# Patient Record
Sex: FEMALE | Race: WHITE | Employment: FULL TIME | URBAN - METROPOLITAN AREA
[De-identification: names, ages, dates, MRNs, and addresses within clinical notes are randomized per-mention and may not be internally consistent; named-entity substitution may affect disease eponyms.]

---

## 2020-05-15 ENCOUNTER — APPOINTMENT (OUTPATIENT)
Dept: CT IMAGING | Age: 45
End: 2020-05-15
Attending: EMERGENCY MEDICINE
Payer: COMMERCIAL

## 2020-05-15 ENCOUNTER — HOSPITAL ENCOUNTER (EMERGENCY)
Age: 45
Discharge: HOME | End: 2020-05-16
Attending: EMERGENCY MEDICINE | Admitting: EMERGENCY MEDICINE
Payer: COMMERCIAL

## 2020-05-15 ENCOUNTER — APPOINTMENT (OUTPATIENT)
Dept: GENERAL RADIOLOGY | Age: 45
End: 2020-05-15
Attending: PHYSICIAN ASSISTANT
Payer: COMMERCIAL

## 2020-05-15 ENCOUNTER — APPOINTMENT (OUTPATIENT)
Dept: ULTRASOUND IMAGING | Age: 45
End: 2020-05-15
Attending: EMERGENCY MEDICINE
Payer: COMMERCIAL

## 2020-05-15 VITALS
TEMPERATURE: 98 F | OXYGEN SATURATION: 99 % | HEART RATE: 104 BPM | SYSTOLIC BLOOD PRESSURE: 108 MMHG | DIASTOLIC BLOOD PRESSURE: 70 MMHG | RESPIRATION RATE: 15 BRPM | BODY MASS INDEX: 20.83 KG/M2 | HEIGHT: 64 IN | WEIGHT: 122 LBS

## 2020-05-15 DIAGNOSIS — R14.1 GAS PAIN: ICD-10-CM

## 2020-05-15 DIAGNOSIS — R10.32 ABDOMINAL PAIN, LLQ (LEFT LOWER QUADRANT): Primary | ICD-10-CM

## 2020-05-15 LAB
ALBUMIN SERPL-MCNC: 3.9 G/DL (ref 3.4–5)
ALBUMIN/GLOB SERPL: 1.2 {RATIO} (ref 0.8–1.7)
ALP SERPL-CCNC: 76 U/L (ref 45–117)
ALT SERPL-CCNC: 35 U/L (ref 13–56)
ANION GAP SERPL CALC-SCNC: 5 MMOL/L (ref 3–18)
APPEARANCE UR: CLEAR
AST SERPL-CCNC: 21 U/L (ref 10–38)
BASOPHILS # BLD: 0 K/UL (ref 0–0.1)
BASOPHILS NFR BLD: 1 % (ref 0–2)
BILIRUB SERPL-MCNC: 0.4 MG/DL (ref 0.2–1)
BILIRUB UR QL: NEGATIVE
BUN SERPL-MCNC: 12 MG/DL (ref 7–18)
BUN/CREAT SERPL: 14 (ref 12–20)
CALCIUM SERPL-MCNC: 8.9 MG/DL (ref 8.5–10.1)
CHLORIDE SERPL-SCNC: 108 MMOL/L (ref 100–111)
CO2 SERPL-SCNC: 28 MMOL/L (ref 21–32)
COLOR UR: YELLOW
CREAT SERPL-MCNC: 0.88 MG/DL (ref 0.6–1.3)
DIFFERENTIAL METHOD BLD: NORMAL
EOSINOPHIL # BLD: 0.1 K/UL (ref 0–0.4)
EOSINOPHIL NFR BLD: 2 % (ref 0–5)
ERYTHROCYTE [DISTWIDTH] IN BLOOD BY AUTOMATED COUNT: 12.6 % (ref 11.6–14.5)
GLOBULIN SER CALC-MCNC: 3.3 G/DL (ref 2–4)
GLUCOSE SERPL-MCNC: 98 MG/DL (ref 74–99)
GLUCOSE UR STRIP.AUTO-MCNC: NEGATIVE MG/DL
HCG UR QL: NEGATIVE
HCT VFR BLD AUTO: 41.9 % (ref 35–45)
HGB BLD-MCNC: 13.7 G/DL (ref 12–16)
HGB UR QL STRIP: NEGATIVE
KETONES UR QL STRIP.AUTO: ABNORMAL MG/DL
LEUKOCYTE ESTERASE UR QL STRIP.AUTO: NEGATIVE
LYMPHOCYTES # BLD: 1.5 K/UL (ref 0.9–3.6)
LYMPHOCYTES NFR BLD: 33 % (ref 21–52)
MCH RBC QN AUTO: 30.4 PG (ref 24–34)
MCHC RBC AUTO-ENTMCNC: 32.7 G/DL (ref 31–37)
MCV RBC AUTO: 93.1 FL (ref 74–97)
MONOCYTES # BLD: 0.5 K/UL (ref 0.05–1.2)
MONOCYTES NFR BLD: 10 % (ref 3–10)
NEUTS SEG # BLD: 2.5 K/UL (ref 1.8–8)
NEUTS SEG NFR BLD: 54 % (ref 40–73)
NITRITE UR QL STRIP.AUTO: NEGATIVE
PH UR STRIP: 7 [PH] (ref 5–8)
PLATELET # BLD AUTO: 215 K/UL (ref 135–420)
PMV BLD AUTO: 9.7 FL (ref 9.2–11.8)
POTASSIUM SERPL-SCNC: 3.5 MMOL/L (ref 3.5–5.5)
PROT SERPL-MCNC: 7.2 G/DL (ref 6.4–8.2)
PROT UR STRIP-MCNC: NEGATIVE MG/DL
RBC # BLD AUTO: 4.5 M/UL (ref 4.2–5.3)
SODIUM SERPL-SCNC: 141 MMOL/L (ref 136–145)
SP GR UR REFRACTOMETRY: 1.02 (ref 1–1.03)
TROPONIN I SERPL-MCNC: <0.02 NG/ML (ref 0–0.04)
UROBILINOGEN UR QL STRIP.AUTO: 1 EU/DL (ref 0.2–1)
WBC # BLD AUTO: 4.6 K/UL (ref 4.6–13.2)

## 2020-05-15 PROCEDURE — 93005 ELECTROCARDIOGRAM TRACING: CPT

## 2020-05-15 PROCEDURE — 81025 URINE PREGNANCY TEST: CPT

## 2020-05-15 PROCEDURE — 74177 CT ABD & PELVIS W/CONTRAST: CPT

## 2020-05-15 PROCEDURE — 71045 X-RAY EXAM CHEST 1 VIEW: CPT

## 2020-05-15 PROCEDURE — 74011636320 HC RX REV CODE- 636/320: Performed by: EMERGENCY MEDICINE

## 2020-05-15 PROCEDURE — 84484 ASSAY OF TROPONIN QUANT: CPT

## 2020-05-15 PROCEDURE — 99283 EMERGENCY DEPT VISIT LOW MDM: CPT

## 2020-05-15 PROCEDURE — 76856 US EXAM PELVIC COMPLETE: CPT

## 2020-05-15 PROCEDURE — 96374 THER/PROPH/DIAG INJ IV PUSH: CPT

## 2020-05-15 PROCEDURE — 81003 URINALYSIS AUTO W/O SCOPE: CPT

## 2020-05-15 PROCEDURE — 74011250636 HC RX REV CODE- 250/636: Performed by: EMERGENCY MEDICINE

## 2020-05-15 PROCEDURE — 85025 COMPLETE CBC W/AUTO DIFF WBC: CPT

## 2020-05-15 PROCEDURE — 80053 COMPREHEN METABOLIC PANEL: CPT

## 2020-05-15 RX ORDER — KETOROLAC TROMETHAMINE 30 MG/ML
15 INJECTION, SOLUTION INTRAMUSCULAR; INTRAVENOUS
Status: COMPLETED | OUTPATIENT
Start: 2020-05-15 | End: 2020-05-15

## 2020-05-15 RX ORDER — BUPROPION HYDROCHLORIDE 150 MG/1
TABLET, EXTENDED RELEASE ORAL 2 TIMES DAILY
COMMUNITY

## 2020-05-15 RX ADMIN — KETOROLAC TROMETHAMINE 15 MG: 30 INJECTION, SOLUTION INTRAMUSCULAR at 20:08

## 2020-05-15 RX ADMIN — IOPAMIDOL 90 ML: 612 INJECTION, SOLUTION INTRAVENOUS at 20:25

## 2020-05-15 NOTE — ED PROVIDER NOTES
EMERGENCY DEPARTMENT HISTORY AND PHYSICAL EXAM    7:55 PM      Date: 5/15/2020  Patient Name: Magi Souza    History of Presenting Illness     Chief Complaint   Patient presents with    Abdominal Pain         History Provided By: Patient    Chief Complaint: left lower abd  Duration:  Days  Timing:  Acute  Location: left lower  Quality: Sharp and Pressure  Severity: Moderate  Modifying Factors: none  Associated Symptoms: denies any other associated signs or symptoms      Additional History (Context): Magi Suoza is a 50 y.o. female with No significant past medical history who presents with left lower abdominal pain. Ongoing for a few days. Reports it feels crampy and at times sharp. Also reports feeling bloated and bloating sensation going up into her upper abdomen and chest.  No alleviating factors. Does report she has had decreased appetite. No blood in her stool. No similar prior episodes. No urinary symptoms. Has not taken anything for this. No prior abdominal surgeries. PCP: Linda Cuevas MD    Current Outpatient Medications   Medication Sig Dispense Refill    buPROPion SR (WELLBUTRIN SR) 150 mg SR tablet Take  by mouth two (2) times a day. Past History     Past Medical History:  Past Medical History:   Diagnosis Date    Ovarian cyst        Past Surgical History:  History reviewed. No pertinent surgical history. Family History:  History reviewed. No pertinent family history. Social History:  Social History     Tobacco Use    Smoking status: Never Smoker    Smokeless tobacco: Never Used   Substance Use Topics    Alcohol use: Yes    Drug use: Yes     Types: Marijuana       Allergies:  No Known Allergies      Review of Systems       Review of Systems   Constitutional: Negative for chills and fever. Respiratory: Negative for cough and shortness of breath. Gastrointestinal: Positive for abdominal pain. Negative for blood in stool, diarrhea and vomiting.    Genitourinary: Negative for dysuria and vaginal bleeding. All other systems reviewed and are negative. Physical Exam     Visit Vitals  /70   Pulse (!) 104   Temp 98 °F (36.7 °C)   Resp 15   Ht 5' 4\" (1.626 m)   Wt 55.3 kg (122 lb)   LMP 05/08/2020   SpO2 99%   BMI 20.94 kg/m²         Physical Exam  Constitutional:       Appearance: She is well-developed. HENT:      Head: Normocephalic and atraumatic. Neck:      Musculoskeletal: Neck supple. Vascular: No JVD. Cardiovascular:      Rate and Rhythm: Normal rate and regular rhythm. Pulmonary:      Effort: Pulmonary effort is normal. No respiratory distress. Breath sounds: Normal breath sounds. Abdominal:      General: There is no distension. Palpations: Abdomen is soft. Tenderness: There is abdominal tenderness (moderate) in the left lower quadrant. There is no guarding or rebound. Musculoskeletal:      Comments: No joint tenderness   Skin:     General: Skin is warm and dry. Findings: No erythema. Neurological:      Mental Status: She is alert and oriented to person, place, and time.    Psychiatric:         Judgment: Judgment normal.           Diagnostic Study Results     Labs -  Recent Results (from the past 12 hour(s))   URINALYSIS W/ RFLX MICROSCOPIC    Collection Time: 05/15/20  6:50 PM   Result Value Ref Range    Color YELLOW      Appearance CLEAR      Specific gravity 1.020 1.005 - 1.030      pH (UA) 7.0 5.0 - 8.0      Protein Negative NEG mg/dL    Glucose Negative NEG mg/dL    Ketone TRACE (A) NEG mg/dL    Bilirubin Negative NEG      Blood Negative NEG      Urobilinogen 1.0 0.2 - 1.0 EU/dL    Nitrites Negative NEG      Leukocyte Esterase Negative NEG     HCG URINE, QL    Collection Time: 05/15/20  6:50 PM   Result Value Ref Range    HCG urine, QL Negative NEG     EKG, 12 LEAD, INITIAL    Collection Time: 05/15/20  7:03 PM   Result Value Ref Range    Ventricular Rate 85 BPM    Atrial Rate 85 BPM    P-R Interval 162 ms    QRS Duration 94 ms    Q-T Interval 368 ms    QTC Calculation (Bezet) 437 ms    Calculated P Axis 84 degrees    Calculated R Axis 74 degrees    Calculated T Axis 68 degrees    Diagnosis       Normal sinus rhythm  Normal ECG  No previous ECGs available     CBC WITH AUTOMATED DIFF    Collection Time: 05/15/20  7:30 PM   Result Value Ref Range    WBC 4.6 4.6 - 13.2 K/uL    RBC 4.50 4. 20 - 5.30 M/uL    HGB 13.7 12.0 - 16.0 g/dL    HCT 41.9 35.0 - 45.0 %    MCV 93.1 74.0 - 97.0 FL    MCH 30.4 24.0 - 34.0 PG    MCHC 32.7 31.0 - 37.0 g/dL    RDW 12.6 11.6 - 14.5 %    PLATELET 116 107 - 842 K/uL    MPV 9.7 9.2 - 11.8 FL    NEUTROPHILS 54 40 - 73 %    LYMPHOCYTES 33 21 - 52 %    MONOCYTES 10 3 - 10 %    EOSINOPHILS 2 0 - 5 %    BASOPHILS 1 0 - 2 %    ABS. NEUTROPHILS 2.5 1.8 - 8.0 K/UL    ABS. LYMPHOCYTES 1.5 0.9 - 3.6 K/UL    ABS. MONOCYTES 0.5 0.05 - 1.2 K/UL    ABS. EOSINOPHILS 0.1 0.0 - 0.4 K/UL    ABS. BASOPHILS 0.0 0.0 - 0.1 K/UL    DF AUTOMATED     METABOLIC PANEL, COMPREHENSIVE    Collection Time: 05/15/20  7:30 PM   Result Value Ref Range    Sodium 141 136 - 145 mmol/L    Potassium 3.5 3.5 - 5.5 mmol/L    Chloride 108 100 - 111 mmol/L    CO2 28 21 - 32 mmol/L    Anion gap 5 3.0 - 18 mmol/L    Glucose 98 74 - 99 mg/dL    BUN 12 7.0 - 18 MG/DL    Creatinine 0.88 0.6 - 1.3 MG/DL    BUN/Creatinine ratio 14 12 - 20      GFR est AA >60 >60 ml/min/1.73m2    GFR est non-AA >60 >60 ml/min/1.73m2    Calcium 8.9 8.5 - 10.1 MG/DL    Bilirubin, total 0.4 0.2 - 1.0 MG/DL    ALT (SGPT) 35 13 - 56 U/L    AST (SGOT) 21 10 - 38 U/L    Alk.  phosphatase 76 45 - 117 U/L    Protein, total 7.2 6.4 - 8.2 g/dL    Albumin 3.9 3.4 - 5.0 g/dL    Globulin 3.3 2.0 - 4.0 g/dL    A-G Ratio 1.2 0.8 - 1.7     TROPONIN I    Collection Time: 05/15/20  7:30 PM   Result Value Ref Range    Troponin-I, QT <0.02 0.0 - 0.045 NG/ML       Radiologic Studies -   XR CHEST PORT    (Results Pending)   CT ABD PELV W CONT    (Results Pending)   US PELV NON OB W TV (Results Pending)     CT: Prelim: Small volume nonspecific fluid in the pelvis. No definite acute findings to suggest an etiology for patient's symptoms. Normal appendix. No diverticulitis. Medical Decision Making   I am the first provider for this patient. I reviewed the vital signs, available nursing notes, past medical history, past surgical history, family history and social history. Vital Signs-Reviewed the patient's vital signs. Pulse Oximetry Analysis -  100 on room air (Interpretation)nl       Records Reviewed: Nursing Notes (Time of Review: 7:55 PM)    ED Course: Progress Notes, Reevaluation, and Consults:      Provider Notes (Medical Decision Making): 47-year-old female presented with left lower abdominal pain ongoing for a few days. Does have significant tenderness on exam so plan for CT to better evaluate for possible diverticulitis. Also check UA. Seems less likely torsion based on duration of symptoms. 9:34 PM  Discussed results with patient. She is reporting some mild improvement or symptoms is still complaining of left lower quadrant pain. Will plan for ultrasound to rule out torsion or cyst.    10 PM : Pt care transferred to Dr. Silva Chenye  ,ED provider. History of patient complaint(s), available diagnostic reports and current treatment plan has been discussed thoroughly. Bedside rounding on patient occured : yes . Intended disposition of patient : TBD  Pending diagnostics reports and/or labs (please list):       Diagnosis     Clinical Impression:   1.  Abdominal pain, LLQ (left lower quadrant)        Disposition: TBD

## 2020-05-15 NOTE — ED TRIAGE NOTES
Left pelvic pain for several weeks. Thought pulled muscle. Then Sunday pain started radiating upwards. Feels distended with gas. Took laxative over three days with no relief. . Pain goes under left ribs. Nauseated. Pain worse with eating.  Just ate [place] and now severe pain

## 2020-05-16 LAB
ATRIAL RATE: 85 BPM
CALCULATED P AXIS, ECG09: 84 DEGREES
CALCULATED R AXIS, ECG10: 74 DEGREES
CALCULATED T AXIS, ECG11: 68 DEGREES
DIAGNOSIS, 93000: NORMAL
P-R INTERVAL, ECG05: 162 MS
Q-T INTERVAL, ECG07: 368 MS
QRS DURATION, ECG06: 94 MS
QTC CALCULATION (BEZET), ECG08: 437 MS
VENTRICULAR RATE, ECG03: 85 BPM

## 2020-05-16 NOTE — ED NOTES
2200-patient signed out to me by Dr. Bernett Mortimer. Patient awaiting ultrasound. Plan is if ultrasound do not show acute pathology, specifically in the left adnexa, patient will be discharged home. [id number] -ultrasound negative. Patient will be discharged home with simethicone. Return precautions reviewed with patient in detail. Patient demonstrates understanding.

## 2020-05-16 NOTE — DISCHARGE INSTRUCTIONS
Patient Education        Abdominal Pain: Care Instructions  Your Care Instructions    Abdominal pain has many possible causes. Some aren't serious and get better on their own in a few days. Others need more testing and treatment. If your pain continues or gets worse, you need to be rechecked and may need more tests to find out what is wrong. You may need surgery to correct the problem. Don't ignore new symptoms, such as fever, nausea and vomiting, urination problems, pain that gets worse, and dizziness. These may be signs of a more serious problem. Your doctor may have recommended a follow-up visit in the next 8 to 12 hours. If you are not getting better, you may need more tests or treatment. The doctor has checked you carefully, but problems can develop later. If you notice any problems or new symptoms, get medical treatment right away. Follow-up care is a key part of your treatment and safety. Be sure to make and go to all appointments, and call your doctor if you are having problems. It's also a good idea to know your test results and keep a list of the medicines you take. How can you care for yourself at home? · Rest until you feel better. · To prevent dehydration, drink plenty of fluids, enough so that your urine is light yellow or clear like water. Choose water and other caffeine-free clear liquids until you feel better. If you have kidney, heart, or liver disease and have to limit fluids, talk with your doctor before you increase the amount of fluids you drink. · If your stomach is upset, eat mild foods, such as rice, dry toast or crackers, bananas, and applesauce. Try eating several small meals instead of two or three large ones. · Wait until 48 hours after all symptoms have gone away before you have spicy foods, alcohol, and drinks that contain caffeine. · Do not eat foods that are high in fat. · Avoid anti-inflammatory medicines such as aspirin, ibuprofen (Advil, Motrin), and naproxen (Aleve). These can cause stomach upset. Talk to your doctor if you take daily aspirin for another health problem. When should you call for help? Call 911 anytime you think you may need emergency care. For example, call if:    · You passed out (lost consciousness).     · You pass maroon or very bloody stools.     · You vomit blood or what looks like coffee grounds.     · You have new, severe belly pain.    Call your doctor now or seek immediate medical care if:    · Your pain gets worse, especially if it becomes focused in one area of your belly.     · You have a new or higher fever.     · Your stools are black and look like tar, or they have streaks of blood.     · You have unexpected vaginal bleeding.     · You have symptoms of a urinary tract infection. These may include:  ? Pain when you urinate. ? Urinating more often than usual.  ? Blood in your urine.     · You are dizzy or lightheaded, or you feel like you may faint.    Watch closely for changes in your health, and be sure to contact your doctor if:    · You are not getting better after 1 day (24 hours). Where can you learn more? Go to http://morgan10BestThingslinda.info/  Enter G096 in the search box to learn more about \"Abdominal Pain: Care Instructions. \"  Current as of: June 26, 2019Content Version: 12.4  © 8495-8774 [employer] Inc[employer]. Care instructions adapted under license by [employer] (which disclaims liability or warranty for this information). If you have questions about a medical condition or this instruction, always ask your healthcare professional. [place] [address] any warranty or liability for your use of this information. Patient Education        Gas and Bloating: Care Instructions  Your Care Instructions    Gas and bloating can be uncomfortable and embarrassing problems. All people pass gas, but some people produce more gas than others, sometimes enough to cause distress.  It is normal to pass gas from 6 to 20 times per day. Excess gas usually is not caused by a serious health problem. Gas and bloating usually are caused by something you eat or drink, including some food supplements and medicines. Gas and bloating are usually harmless and go away without treatment. However, changing your diet can help end the problem. Some over-the-counter medicines can help prevent gas and relieve bloating. Follow-up care is a key part of your treatment and safety. Be sure to make and go to all appointments, and call your doctor if you are having problems. It's also a good idea to know your test results and keep a list of the medicines you take. How can you care for yourself at home? · Keep a food diary if you think a food gives you gas. Write down what you eat or drink. Also record when you get gas. If you notice that a food seems to cause your gas each time, avoid it and see if the gas goes away. Examples of foods that cause gas include:  ? Fried and fatty foods. ? Beans. ? Vegetables such as artichokes, asparagus, broccoli, brussels sprouts, cabbage, cauliflower, cucumbers, green peppers, onions, peas, radishes, and raw potatoes. ? Fruits such as apricots, bananas, melons, peaches, pears, prunes, and raw apples. ? Wheat and wheat bran. · Soak dry beans in water overnight, then dump the water and cook the soaked beans in new water. This can help prevent gas and bloating. · If you have problems with lactose, avoid dairy products such as milk and cheese. · Try not to swallow air. Do not drink through a straw, gulp your food, or chew gum. · Take an over-the-counter medicine. Read and follow all instructions on the label. ? Food enzymes, such as Beano, can be added to gas-producing foods to prevent gas. ? Antacids, such as Maalox Anti-Gas and Mylanta Gas, can relieve bloating by making you burp. Be careful when you take over-the-counter antacid medicines. Many of these medicines have aspirin in them.  Read the label to make sure that you are not taking more than the recommended dose. Too much aspirin can be harmful. ? Activated charcoal tablets, such as CharcoCaps, may decrease odor from gas you pass. ? If you have problems with lactose, you can take medicines such as Dairy Ease and Lactaid with dairy products to prevent gas and bloating. · Get some exercise regularly. When should you call for help? Call 911 anytime you think you may need emergency care. For example, call if:    · You have gas and signs of a heart attack, such as:  ? Chest pain or pressure. ? Sweating. ? Shortness of breath. ? Nausea or vomiting. ? Pain that spreads from the chest to the neck, jaw, or one or both shoulders or arms. ? Dizziness or lightheadedness. ? A fast or uneven pulse. After calling  911, chew 1 adult-strength aspirin. Wait for an ambulance. Do not try to drive yourself.    Call your doctor now or seek immediate medical care if:    · You have severe belly pain.     · You have blood in your stool.    Watch closely for changes in your health, and be sure to contact your doctor if:    · You have blood or pus in your urine.     · Your urine is cloudy or smells bad.     · You are burping and have trouble swallowing.     · You feel bloated and have swelling in your belly.     · You do not get better as expected. Where can you learn more? Go to http://morgan-linda.info/  Enter [id number] in the search box to learn more about \"Gas and Bloating: Care Instructions. \"  Current as of: June 26, 2019Content Version: 12.4  © 2961-0775 [employer]wise, Incorporated. Care instructions adapted under license by [employer] (which disclaims liability or warranty for this information). If you have questions about a medical condition or this instruction, always ask your healthcare professional. [address] any warranty or liability for your use of this information.